# Patient Record
Sex: FEMALE | Race: WHITE | NOT HISPANIC OR LATINO | Employment: FULL TIME | URBAN - METROPOLITAN AREA
[De-identification: names, ages, dates, MRNs, and addresses within clinical notes are randomized per-mention and may not be internally consistent; named-entity substitution may affect disease eponyms.]

---

## 2022-09-26 ENCOUNTER — HOSPITAL ENCOUNTER (EMERGENCY)
Facility: CLINIC | Age: 37
Discharge: HOME | End: 2022-09-26
Attending: EMERGENCY MEDICINE | Admitting: EMERGENCY MEDICINE
Payer: COMMERCIAL

## 2022-09-26 VITALS
WEIGHT: 135 LBS | RESPIRATION RATE: 16 BRPM | DIASTOLIC BLOOD PRESSURE: 90 MMHG | OXYGEN SATURATION: 96 % | SYSTOLIC BLOOD PRESSURE: 151 MMHG | BODY MASS INDEX: 23.92 KG/M2 | HEART RATE: 96 BPM | HEIGHT: 63 IN | TEMPERATURE: 98.8 F

## 2022-09-26 DIAGNOSIS — M54.42 ACUTE LEFT-SIDED LOW BACK PAIN WITH LEFT-SIDED SCIATICA: ICD-10-CM

## 2022-09-26 PROCEDURE — 99284 EMERGENCY DEPT VISIT MOD MDM: CPT | Performed by: EMERGENCY MEDICINE

## 2022-09-26 RX ORDER — TIZANIDINE HYDROCHLORIDE 4 MG/1
4 CAPSULE, GELATIN COATED ORAL 3 TIMES DAILY PRN
Qty: 30 CAPSULE | Refills: 0 | Status: SHIPPED | OUTPATIENT
Start: 2022-09-26 | End: 2022-10-06

## 2022-09-26 RX ORDER — LIDOCAINE 50 MG/G
1 PATCH TOPICAL EVERY 24 HOURS
Qty: 10 PATCH | Refills: 0 | Status: SHIPPED | OUTPATIENT
Start: 2022-09-26 | End: 2022-10-06

## 2022-09-26 ASSESSMENT — ENCOUNTER SYMPTOMS
DIARRHEA: 0
COUGH: 0
WEAKNESS: 0
NAUSEA: 0
BACK PAIN: 1
ABDOMINAL PAIN: 0
NUMBNESS: 0
HEADACHES: 0
EYE REDNESS: 0
CONFUSION: 0
VOMITING: 0
FEVER: 0
SEIZURES: 0
SHORTNESS OF BREATH: 0
NECK PAIN: 0
DIFFICULTY URINATING: 0

## 2022-09-26 ASSESSMENT — ACTIVITIES OF DAILY LIVING (ADL): ADLS_ACUITY_SCORE: 35

## 2022-09-26 NOTE — ED TRIAGE NOTES
"Triage Assessment & Note:    BP (!) 151/90   Pulse 96   Temp 98.8  F (37.1  C) (Temporal)   Resp 16   Ht 1.6 m (5' 3\")   Wt 61.2 kg (135 lb)   SpO2 96%   BMI 23.91 kg/m      Patient presents with: PT c/o left lower back pain. PT denies any changes in bowel or bladder habits. PT reports she has on and off back pain and today it got worse while at work. CMS intact.     Home Treatments/Remedies: OTC meds    Febrile / Afebrile? Afebrile     Duration of C/o:  5 days     Enoc Evans RN  September 26, 2022         Triage Assessment     Row Name 09/26/22 4137       Triage Assessment (Adult)    Airway WDL WDL       Respiratory WDL    Respiratory WDL WDL       Cardiac WDL    Cardiac WDL WDL              "

## 2022-09-26 NOTE — ED PROVIDER NOTES
ED Provider Note  [facility]      History     Chief Complaint   Patient presents with     Back Pain     HPI  Johana Hansen is a 37 year old female who presents to the emergency department with low back pain with shooting pain radiating down her posterior left leg.  She has had similar sensations in the past and has undergone outpatient MRI which showed some disc bulge and herniation.  She reports that in the past when she has had similar symptoms she has tried multiple medications but has not derive significant benefit from any of them.  She reports she is currently doing NSAIDs.  Has an active prescription for gabapentin, however she has not taken any for this episode of pain.  She reports the pain began 4 days ago.  She reports she is still able to walk.  Can only stay in 1 position for a short period of time and then has to shift positions.  She reports she lays down and has some relief but after a while she feels like her left lower back has a spasm and she has to move to relieve the pain.  She reports that the pain is worst with sitting down.  She denies any red flag symptoms such as bowel or bladder incontinence, saddle anesthesia when she uses the restroom, jaky weakness, or history of IV drug use.  She has not had any fevers or chills.      Past Medical History  No past medical history on file.  No past surgical history on file.  lidocaine (LIDODERM) 5 % patch  tiZANidine (ZANAFLEX) 4 MG capsule      No Known Allergies  Family History  No family history on file.  Social History       Past medical history, past surgical history, medications, allergies, family history, and social history were reviewed with the patient. No additional pertinent items.       Review of Systems   Constitutional: Negative for fever.   HENT: Negative for congestion.    Eyes: Negative for redness.   Respiratory: Negative for cough and shortness of breath.    Cardiovascular: Negative for chest pain.  "  Gastrointestinal: Negative for abdominal pain, diarrhea, nausea and vomiting.   Genitourinary: Negative for difficulty urinating.   Musculoskeletal: Positive for back pain. Negative for gait problem and neck pain.   Skin: Negative for rash.   Neurological: Negative for seizures, weakness, numbness and headaches.   Psychiatric/Behavioral: Negative for confusion.     A complete review of systems was performed with pertinent positives and negatives noted in the HPI, and all other systems negative.    Physical Exam   BP: (!) 151/90  Pulse: 96  Temp: 98.8  F (37.1  C)  Resp: 16  Height: 160 cm (5' 3\")  Weight: 61.2 kg (135 lb)  SpO2: 96 %  Physical Exam  Constitutional:       General: She is awake. She is not in acute distress.     Appearance: Normal appearance. She is well-developed. She is not ill-appearing or toxic-appearing.   HENT:      Head: Atraumatic.      Mouth/Throat:      Pharynx: No oropharyngeal exudate.   Eyes:      General: No scleral icterus.     Pupils: Pupils are equal, round, and reactive to light.   Cardiovascular:      Rate and Rhythm: Normal rate and regular rhythm.   Pulmonary:      Effort: Pulmonary effort is normal. No respiratory distress.   Abdominal:      General: Abdomen is flat.   Musculoskeletal:         General: No tenderness.      Comments: No midline thoracic or lumbar tenderness to palpation.  She does have tenderness to palpation in the left paraspinous region   Skin:     General: Skin is warm.      Findings: No rash.   Neurological:      Mental Status: She is alert and oriented to person, place, and time.      Gait: Gait and tandem walk normal.      Deep Tendon Reflexes:      Reflex Scores:       Patellar reflexes are 2+ on the right side and 2+ on the left side.     Comments: Normal strength and sensation in bilateral lower extremities. However when testing hip flexion on the right, this elicits pain in the left hip that shoots down her left leg.  When testing hip flexion on the " left, this elicits pain also in the left hip that shoots down her left leg.   Psychiatric:         Attention and Perception: Attention normal.         Mood and Affect: Mood normal.         Speech: Speech normal.         Behavior: Behavior normal. Behavior is cooperative.         ED Course      Procedures                     No results found for any visits on 09/26/22.  Medications - No data to display     Assessments & Plan (with Medical Decision Making)   Johana Hansen is a 37 year old female who presents to the emergency department with low back pain with shooting pain radiating down her posterior left leg.  Most consistent with sciatic type back pain, no red flags here.  I saw the patient in the triage area due to extremely limited space in the main emergency department.  However based on the patient's history and very reassuring exam, do not feel that she requires any emergent imaging or lab work at this time.  The main goal is symptom control and will refer to spine clinic for further outpatient management.  She reports that she has a bottle of gabapentin at home, I encouraged her to try this medication.  I encouraged her to continue taking NSAID medication.  In addition, for the strong concern I have that there is a muscular component to this, I will prescribe a course of Zanaflex as well as Lidoderm patches.  Return precautions given, okay to discharge    I have reviewed the nursing notes. I have reviewed the findings, diagnosis, plan and need for follow up with the patient.    New Prescriptions    LIDOCAINE (LIDODERM) 5 % PATCH    Place 1 patch onto the skin every 24 hours for 10 days To prevent lidocaine toxicity, patient should be patch free for 12 hrs daily.    TIZANIDINE (ZANAFLEX) 4 MG CAPSULE    Take 1 capsule (4 mg) by mouth 3 times daily as needed for muscle spasms       Final diagnoses:   Acute left-sided low back pain with left-sided sciatica       --  Jarrod Mullen MD PhD  [facility]  [facility] EMERGENCY DEPARTMENT  9/26/2022     Jarrod Mullen MD  09/26/22 [id number]

## 2022-09-26 NOTE — DISCHARGE INSTRUCTIONS
Please restart taking gabapentin according to the directions on instructions on the prescription bottle you already have.  Please also continue to take ibuprofen and Tylenol as needed and as directed for your pain.

## 2022-10-10 ENCOUNTER — THERAPY VISIT (OUTPATIENT)
Dept: PHYSICAL THERAPY | Facility: CLINIC | Age: 37
End: 2022-10-10
Payer: COMMERCIAL

## 2022-10-10 DIAGNOSIS — M54.42 BILATERAL LOW BACK PAIN WITH LEFT-SIDED SCIATICA: ICD-10-CM

## 2022-10-10 PROCEDURE — 97110 THERAPEUTIC EXERCISES: CPT | Mod: GP | Performed by: PHYSICAL THERAPIST

## 2022-10-10 PROCEDURE — 97161 PT EVAL LOW COMPLEX 20 MIN: CPT | Mod: GP | Performed by: PHYSICAL THERAPIST

## 2022-12-23 PROBLEM — M54.42 BILATERAL LOW BACK PAIN WITH LEFT-SIDED SCIATICA: Status: RESOLVED | Noted: 2022-10-10 | Resolved: 2022-12-23

## 2023-04-25 ENCOUNTER — APPOINTMENT (OUTPATIENT)
Dept: ULTRASOUND IMAGING | Facility: CLINIC | Age: 38
End: 2023-04-25
Attending: EMERGENCY MEDICINE
Payer: COMMERCIAL

## 2023-04-25 ENCOUNTER — HOSPITAL ENCOUNTER (EMERGENCY)
Facility: CLINIC | Age: 38
Discharge: HOME | End: 2023-04-25
Attending: EMERGENCY MEDICINE | Admitting: EMERGENCY MEDICINE
Payer: COMMERCIAL

## 2023-04-25 VITALS
RESPIRATION RATE: 15 BRPM | DIASTOLIC BLOOD PRESSURE: 92 MMHG | BODY MASS INDEX: 23.49 KG/M2 | WEIGHT: 132.6 LBS | SYSTOLIC BLOOD PRESSURE: 137 MMHG | TEMPERATURE: 97.9 F | OXYGEN SATURATION: 99 % | HEART RATE: 82 BPM

## 2023-04-25 DIAGNOSIS — M54.16 LUMBAR RADICULOPATHY: ICD-10-CM

## 2023-04-25 LAB
ALBUMIN UR-MCNC: NEGATIVE MG/DL
APPEARANCE UR: ABNORMAL
BILIRUB UR QL STRIP: NEGATIVE
COLOR UR AUTO: ABNORMAL
GLUCOSE UR STRIP-MCNC: NEGATIVE MG/DL
HCG UR QL: NEGATIVE
HGB UR QL STRIP: ABNORMAL
KETONES UR STRIP-MCNC: NEGATIVE MG/DL
LEUKOCYTE ESTERASE UR QL STRIP: ABNORMAL
MUCOUS THREADS #/AREA URNS LPF: PRESENT /LPF
NITRATE UR QL: NEGATIVE
PH UR STRIP: 5.5 [PH] (ref 5–7)
RBC URINE: 6 /HPF
SP GR UR STRIP: 1.02 (ref 1–1.03)
SQUAMOUS EPITHELIAL: 10 /HPF
UROBILINOGEN UR STRIP-MCNC: NORMAL MG/DL
WBC URINE: 3 /HPF

## 2023-04-25 PROCEDURE — 250N000011 HC RX IP 250 OP 636: Performed by: EMERGENCY MEDICINE

## 2023-04-25 PROCEDURE — 99284 EMERGENCY DEPT VISIT MOD MDM: CPT | Mod: 25 | Performed by: EMERGENCY MEDICINE

## 2023-04-25 PROCEDURE — 81003 URINALYSIS AUTO W/O SCOPE: CPT | Performed by: EMERGENCY MEDICINE

## 2023-04-25 PROCEDURE — 76856 US EXAM PELVIC COMPLETE: CPT | Mod: 26 | Performed by: RADIOLOGY

## 2023-04-25 PROCEDURE — 87086 URINE CULTURE/COLONY COUNT: CPT | Performed by: EMERGENCY MEDICINE

## 2023-04-25 PROCEDURE — 99284 EMERGENCY DEPT VISIT MOD MDM: CPT | Performed by: EMERGENCY MEDICINE

## 2023-04-25 PROCEDURE — 96372 THER/PROPH/DIAG INJ SC/IM: CPT | Performed by: EMERGENCY MEDICINE

## 2023-04-25 PROCEDURE — 81025 URINE PREGNANCY TEST: CPT | Performed by: EMERGENCY MEDICINE

## 2023-04-25 PROCEDURE — 76856 US EXAM PELVIC COMPLETE: CPT | Mod: 59

## 2023-04-25 PROCEDURE — 76830 TRANSVAGINAL US NON-OB: CPT | Mod: 26 | Performed by: RADIOLOGY

## 2023-04-25 RX ORDER — METHYLPREDNISOLONE 4 MG
TABLET, DOSE PACK ORAL
Qty: 21 TABLET | Refills: 0 | Status: SHIPPED | OUTPATIENT
Start: 2023-04-25

## 2023-04-25 RX ORDER — ONDANSETRON 4 MG/1
TABLET, FILM COATED ORAL
COMMUNITY
Start: 2022-06-09

## 2023-04-25 RX ORDER — GABAPENTIN 100 MG/1
CAPSULE ORAL
COMMUNITY
Start: 2022-10-11 | End: 2023-04-25

## 2023-04-25 RX ORDER — LIDOCAINE 50 MG/G
PATCH TOPICAL
COMMUNITY
Start: 2022-09-26

## 2023-04-25 RX ORDER — KETOROLAC TROMETHAMINE 30 MG/ML
30 INJECTION, SOLUTION INTRAMUSCULAR; INTRAVENOUS ONCE
Status: COMPLETED | OUTPATIENT
Start: 2023-04-25 | End: 2023-04-25

## 2023-04-25 RX ORDER — KETOROLAC TROMETHAMINE 30 MG/ML
30 INJECTION, SOLUTION INTRAMUSCULAR; INTRAVENOUS ONCE
Status: DISCONTINUED | OUTPATIENT
Start: 2023-04-25 | End: 2023-04-25

## 2023-04-25 RX ORDER — GABAPENTIN 100 MG/1
200 CAPSULE ORAL 3 TIMES DAILY
Qty: 180 CAPSULE | Refills: 0 | Status: SHIPPED | OUTPATIENT
Start: 2023-04-25 | End: 2023-05-25

## 2023-04-25 RX ORDER — OXYCODONE HYDROCHLORIDE 5 MG/1
5 TABLET ORAL EVERY 6 HOURS PRN
Qty: 6 TABLET | Refills: 0 | Status: SHIPPED | OUTPATIENT
Start: 2023-04-25 | End: 2023-04-28

## 2023-04-25 RX ORDER — TIZANIDINE HYDROCHLORIDE 4 MG/1
CAPSULE, GELATIN COATED ORAL
COMMUNITY
Start: 2022-09-26

## 2023-04-25 RX ADMIN — KETOROLAC TROMETHAMINE 30 MG: 30 INJECTION, SOLUTION INTRAMUSCULAR; INTRAVENOUS at 17:00

## 2023-04-25 ASSESSMENT — ACTIVITIES OF DAILY LIVING (ADL)
ADLS_ACUITY_SCORE: 33
ADLS_ACUITY_SCORE: 35

## 2023-04-25 NOTE — ED TRIAGE NOTES
Pt arrives with concerns of back pain for the past 4 weeks. She has a hx of back pain and says it usually gets better with ice and rest. She is on gabapentin and it is not helping. No specific injury to her back. She says she has not seen a physician in the past 4 weeks for her pain. No loss of bowel or bladder control. She says the pain is on the L side, the front, side, and inguinals bilaterally.       [FreeTextEntry1] : I, Loc Hsu, acted solely as a scribe for Dr. Pee Villalpando on this date 04/14/2021.\par All medical record entries made by the Scribe were at my, Dr. Pee Villalpando, direction and personally dictated by me on 04/14/2021. I have reviewed the chart and agree that the record accurately reflects my personal performance of the history, physical exam, assessment and plan. I have also personally directed, reviewed, and agreed with the chart.

## 2023-04-25 NOTE — DISCHARGE INSTRUCTIONS
Please make an appointment to follow up with Your Primary Care Provider and Pain Clinic (phone: [phone]) as soon as possible.    You were given a referral to Pain clinic. Someone should call you to set up this appointment, but please call the number listed above, if you do not hear from someone within 1-2 business days.     Take 600 mg ibuprofen every 8 hours, for pain and inflammation.  Take this on schedule, for approximately 1 week or until resolution of symptoms.    Take this medication with food to prevent stomach upset.  If you taking a chronic antacid medication, make sure to take this while you are taking this medication.    Take medrol dosepak for inflammatory pain. (Steroid)  Increase gabapentin to 200 mg three times daily.   Take Oxycodone for severe pain. Do not drive or drink alcohol while taking this medication. This is a sedating drug and may cause you to be off balance and at risk for falling especially when taken with other sedating medications. Use only as needed, and with caution. You should also take a stool softener while you are on this medication to counteract the side effect of constipation.      Rest, avoid repetitive motion, and lifting over 5 pounds with the effected extremity.  May use ice or heating pad to help with the pain.    Follow-up with her primary care doctor in 1-2 weeks if your symptoms have not resolved. You may benefit from a referral to physical therapy.     Try stool softener for pelvic pain. Return for worsening pain.     Return to the emergency department if you develop worsening pain, weakness, numbness or tingling, bowel or bladder changes.

## 2023-04-25 NOTE — ED PROVIDER NOTES
[facility] EMERGENCY DEPARTMENT ([facility])    23       ED PROVIDER NOTE       History     Chief Complaint   Patient presents with     Back Pain     HPI  Johana Hansen is a 38 year old female she has a prior history of low back pain who presents with back pain for the past 4 weeks and a new lower abdominal/pelvic pain for the past few days.  Pain is mostly in her left lower back radiating through lateral left hip and down leg.  This has been an intermittent recurring pain for years.  She is currently taking gabapentin and a muscle relaxer for this.  She also takes Tylenol.  She states that her most recent pain flare has been going on for several weeks and has not abated.  She is not able to go to work or get anything done.  She says previously physical therapy has helped.  She has not done physical therapy in the past 8 months. She denies any precipitating injury to her back.  No loss of bowel or bladder control.  No saddle anesthesia.  She is also having occasional pain in the right lower back that radiates down the right hip part of the way.  She gets sharp shooting pains that will cause her legs to give out.  She has had recent MRI imaging performed last year.  She notes a new problem which she thinks is unrelated with the intermittent pelvic pain.  She believes it was worse on the left side.  She says it lasts for about 20 minutes and may be provoked by changing from standing to seated position or lying down.  She has had an ovarian cyst many years ago but does not recall if it feels similar.  She says the pain feels somewhat like menstrual cramps but it is hard to describe.  She has not had any vaginal bleeding, discharge, odor.  No pain with urination.  She is not concerned about an STD.  She does note that she has an  IUD.  The pelvic cramping pain is not present currently.    Per [facility] records, patient has had prior MR lumbar spine without contrast 10/2/22.   1. No  high-grade spinal canal stenosis at any lumbar level.   2. At L3-4, active on chronic disc degeneration, progressed. A broad-based central protrusion contacts the traversing L4 nerve roots without impingement. At most minimally increased in size.    3. At L5-S1, a tiny left subarticular protrusion contacts/minimally impinges the left S1 nerve root. Stable.   4. No intradural pathology.        Past Medical History  Past Medical History:   Diagnosis Date     Other and unspecified ovarian cyst     right ovary     Past Surgical History:   Procedure Laterality Date     HC TOOTH EXTRACTION W/FORCEP       HC UGI ENDOSCOPY, SIMPLE EXAM  04/10/08     TONSILLECTOMY       ZZC NONSPECIFIC PROCEDURE      claw toe surgery     gabapentin (NEURONTIN) 100 MG capsule  lidocaine (LIDODERM) 5 % patch  methylPREDNISolone (MEDROL DOSEPAK) 4 MG tablet therapy pack  oxyCODONE (ROXICODONE) 5 MG tablet  tiZANidine (ZANAFLEX) 4 MG capsule  NUVARING 0.12-0.015 MG/24HR VA RING  ondansetron (ZOFRAN) 4 MG tablet  ZOVIRAX 5 % EX OINT      Allergies   Allergen Reactions     Blood-Group Specific Substance Unknown     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  Blood Product orders may be delayed.  Draw one red top and two purple top tubes for ALL Type and Screen/ Type and Crossmatch orders.     Family History  Family History   Problem Relation Age of Onset     Asthma Brother      Asthma Daughter      Diabetes Mother      Hypertension Mother      Allergies Brother      Allergies Daughter      Arthritis Maternal Grandmother         ?     Arthritis Mother         ?     Heart Disease Maternal Grandfather      Heart Disease Paternal Grandfather      Social History   Social History     Tobacco Use     Smoking status: Every Day     Packs/day: 0.50     Types: Cigarettes   Substance Use Topics     Alcohol use: Yes     Comment: rarely     Drug use: No         A medically appropriate review of systems was performed with pertinent positives and negatives noted in the  HPI, and all other systems negative.    Physical Exam   BP: 125/80  Pulse: 80  Temp: 98.2  F (36.8  C)  Resp: 16  Weight: 60.1 kg (132 lb 9.6 oz)  SpO2: 98 %  Physical Exam  Vitals and nursing note reviewed.   Constitutional:       General: She is not in acute distress.     Appearance: Normal appearance. She is well-developed and normal weight. She is not ill-appearing or diaphoretic.   HENT:      Head: Normocephalic and atraumatic.      Nose: Nose normal.      Mouth/Throat:      Mouth: Mucous membranes are moist.   Eyes:      General: No scleral icterus.     Conjunctiva/sclera: Conjunctivae normal.   Cardiovascular:      Rate and Rhythm: Normal rate.   Pulmonary:      Effort: Pulmonary effort is normal. No respiratory distress.      Breath sounds: No stridor.   Abdominal:      General: There is no distension.      Palpations: Abdomen is soft.      Tenderness: There is no abdominal tenderness. There is no guarding or rebound.   Musculoskeletal:         General: No deformity or signs of injury.      Cervical back: Normal range of motion and neck supple. No rigidity.      Lumbar back: Tenderness present. No swelling, deformity, signs of trauma, spasms or bony tenderness. Decreased range of motion.        Back:       Comments: Lumbar back pain not reproducible on palpation.   Skin:     General: Skin is warm and dry.      Coloration: Skin is not jaundiced or pale.      Findings: No rash.   Neurological:      General: No focal deficit present.      Mental Status: She is alert and oriented to person, place, and time.      Sensory: No sensory deficit.      Motor: No weakness.      Gait: Gait normal.   Psychiatric:         Mood and Affect: Mood normal.         Behavior: Behavior normal.           ED Course, Procedures, & Data      Procedures                Results for orders placed or performed during the hospital encounter of 04/25/23   US Pelvis Cmplt w Transvag & Doppler LmtPel Duplex Limited     Status: None     Narrative    EXAM: Ultrasound pelvis complete with transvaginal and Doppler  evaluation 4/25/2023 4:20 PM     HISTORY: Pelvic pain, left, intermittent, evaluate for torsion/cyst.     COMPARISON: Pelvic ultrasound 1/4/2008    TECHNIQUE: The pelvis was scanned in standard fashion with  transabdominal and transvaginal transducer(s) using both grey scale  and spectral flow and  color Doppler techniques.    FINDINGS:  UTERUS: The uterus measures 9.1 x 4.5 x 5.2 cm. No focal fibroid or  suspicious mass. Normal uterine parenchymal echotexture. The  endometrium is within normal limits and measures 2.9 mm. IUD is in  place within the uterus. No free fluid in the pelvis.    OVARIES: The right ovary measures 2.1 x 1.1 x 1.6 with normal  follicular architecture. No right-sided adnexal mass. Normal right  ovary arterial Doppler flow. Left ovary was not visualized due to  overlying bowel gas.     BLADDER: Decompressed.        Impression    IMPRESSION:   Normal ultrasound evaluation of the uterus and right ovary. Left ovary  was not visualized on this exam due to bowel gas. If there is concern  for ovarian torsion of the left ovary consider CT evaluation.    I have personally reviewed the examination and initial interpretation  and I agree with the findings.    ROSALINDA JALLOH MD         SYSTEM ID:  [id number]   UA with Microscopic reflex to Culture     Status: Abnormal    Specimen: Urine, Midstream   Result Value Ref Range    Color Urine Light Yellow Colorless, Straw, Light Yellow, Yellow    Appearance Urine Slightly Cloudy (A) Clear    Glucose Urine Negative Negative mg/dL    Bilirubin Urine Negative Negative    Ketones Urine Negative Negative mg/dL    Specific Gravity Urine 1.018 1.003 - 1.035    Blood Urine Trace (A) Negative    pH Urine 5.5 5.0 - 7.0    Protein Albumin Urine Negative Negative mg/dL    Urobilinogen Urine Normal Normal, 2.0 mg/dL    Nitrite Urine Negative Negative    Leukocyte Esterase Urine Moderate (A) Negative     Mucus Urine Present (A) None Seen /LPF    RBC Urine 6 (H) <=2 /HPF    WBC Urine 3 <=5 /HPF    Squamous Epithelials Urine 10 (H) <=1 /HPF    Narrative    Urine Culture ordered based on laboratory criteria   HCG qualitative urine (UPT)     Status: Normal   Result Value Ref Range    hCG Urine Qualitative Negative Negative     Medications   ketorolac (TORADOL) injection 30 mg (30 mg Intramuscular $Given 4/25/23 1700)     Labs Ordered and Resulted from Time of ED Arrival to Time of ED Departure   ROUTINE UA WITH MICROSCOPIC REFLEX TO CULTURE - Abnormal       Result Value    Color Urine Light Yellow      Appearance Urine Slightly Cloudy (*)     Glucose Urine Negative      Bilirubin Urine Negative      Ketones Urine Negative      Specific Gravity Urine 1.018      Blood Urine Trace (*)     pH Urine 5.5      Protein Albumin Urine Negative      Urobilinogen Urine Normal      Nitrite Urine Negative      Leukocyte Esterase Urine Moderate (*)     Mucus Urine Present (*)     RBC Urine 6 (*)     WBC Urine 3      Squamous Epithelials Urine 10 (*)    HCG QUALITATIVE URINE - Normal    hCG Urine Qualitative Negative     URINE CULTURE     US Pelvis Cmplt w Transvag & Doppler LmtPel Duplex Limited   Final Result   IMPRESSION:    Normal ultrasound evaluation of the uterus and right ovary. Left ovary   was not visualized on this exam due to bowel gas. If there is concern   for ovarian torsion of the left ovary consider CT evaluation.      I have personally reviewed the examination and initial interpretation   and I agree with the findings.      ROSALINDA JALLOH MD            SYSTEM ID:  [id number]             Critical care was not performed.     Medical Decision Making  The patient's presentation was of moderate complexity (an undiagnosed new problem with uncertain diagnosis).    The patient's evaluation involved:  review of external note(s) from 1 sources (see separate area of note for details)  ordering and/or review of 3+ test(s) in  this encounter (see separate area of note for details)  review of 3+ test result(s) ordered prior to this encounter (see separate area of note for details)  strong consideration of a test (see separate area of note for details) that was ultimately deferred    The patient's management necessitated moderate risk (prescription drug management including medications given in the ED).      Assessment & Plan    Johana Hansen is a 38 year old female she has a prior history of low back pain who presents with back pain for the past 4 weeks and a new lower abdominal/pelvic pain for the past few days.    Ddx: Acute on chronic lumbar radiculopathy, disc herniation, lumbar muscle spasm, menstrual cramps, ovarian cyst, intermittent ovarian torsion, constipation    Patient with chronic low back pain with radicular nerve pain as well.  No red flag symptoms.  Intractable to typical medications.  Patient is driving home so unable to give narcotics but was given intramuscular Toradol.  A transvaginal ultrasound was obtained to evaluate intermittent severe pelvic pain.  This resulted with normal uterus and right ovary.  Unable to visualize the left ovary due to bowel gas which is the side the patient reports having severe pain on intermittently.  She said she did have pain on the left side during the exam but it has since resolved.  I offered obtaining a CT scan to attempt better characterization of the pain and potentially visualized a left ovarian abnormality.  However since the patient is currently pain-free I have a low suspicion that we would find an intervenable abnormality.  I also discussed the option for her to return if the pain comes back for additional evaluation at that time and consider follow-up with her primary care provider or gynecologist for further investigation.  The patient also reported being constipated which could be the cause for her lower pelvic cramping.  I recommended over-the-counter stool softeners  or MiraLAX to help treat the constipation.  If this resolves the pelvic cramping pain she would likely not require any additional follow-up.  The patient felt comfortable with this plan and declined further abdominal imaging at this time.  I reviewed patient's previous MRI of the lumbar spine from [facility] that was just obtained last year in October.  Was significant for a broad-based central protrusion at L3-L4 coming into contact with the traversing L4 nerve roots without impingement.  Also a tiny left subarticular protrusion at L5-S1 minimally impinging the left S1 nerve root.  No high-grade spinal canal stenosis at any level and no intradural pathology.  Patient's symptoms are consistent with these findings.  She has not had a recurrent injury or concerning focal neurologic deficits to warrant repeat imaging.  We discussed adding ibuprofen to her home pain regimen for better anti-inflammatory effect.  I also prescribed her a Medrol Dosepak.  Upon review of her meds list it looks like her gabapentin dose which was prescribed to treat nerve pain is actually quite low.  I recommended she double her dose 3 times a day to take 200 mg by mouth 3 times daily.  I prescribed her a small course of oxycodone for acute pain.  I also referred her to pain management clinic for further evaluation of additional back pain interventions such as steroid injections.  The patient has had good luck with physical therapy in the past and I encouraged her to call to schedule additional therapy.  Patient was instructed to continue her additional medications including a muscle relaxant and Tylenol.  Detailed return precautions provided.        I have reviewed the nursing notes. I have reviewed the findings, diagnosis, plan and need for follow up with the patient.    Discharge Medication List as of 4/25/2023  4:58 PM      START taking these medications    Details   methylPREDNISolone (MEDROL DOSEPAK) 4 MG tablet therapy pack Follow Package  Directions, Disp-21 tablet, R-0, Local Print      oxyCODONE (ROXICODONE) 5 MG tablet Take 1 tablet (5 mg) by mouth every 6 hours as needed for severe pain or breakthrough pain, Disp-6 tablet, R-0, E-Prescribe             Final diagnoses:   Lumbar radiculopathy         [facility] EMERGENCY DEPARTMENT  4/25/2023     Tabitha Hood MD  04/26/23 [id number]

## 2023-04-26 NOTE — RESULT ENCOUNTER NOTE
[facility] Emergency Dept discharge antibiotic (if prescribed): None  No changes in treatment per [facility] ED Lab Result Urine culture protocol.

## 2023-04-27 LAB — BACTERIA UR CULT: NORMAL

## 2023-04-27 NOTE — RESULT ENCOUNTER NOTE
Final urine culture report is negative.  Adult Negative Urine culture parameters per protocol: Any # Urogenital single or mixed organism, <10,000 col/ml single organism (cath/midstream), and > 3 organisms (No susceptibilities performed).  [facility] Emergency Dept discharge antibiotic prescribed (If applicable): None  Treatment recommendations per [facility] ED Lab Result Urine Culture protocol.